# Patient Record
Sex: FEMALE | Race: WHITE | URBAN - METROPOLITAN AREA
[De-identification: names, ages, dates, MRNs, and addresses within clinical notes are randomized per-mention and may not be internally consistent; named-entity substitution may affect disease eponyms.]

---

## 2021-08-09 ENCOUNTER — OFFICE VISIT (OUTPATIENT)
Dept: URBAN - METROPOLITAN AREA CLINIC 64 | Facility: CLINIC | Age: 46
End: 2021-08-09
Payer: COMMERCIAL

## 2021-08-09 PROCEDURE — 92133 CPTRZD OPH DX IMG PST SGM ON: CPT | Performed by: OPTOMETRIST

## 2021-08-09 PROCEDURE — 99204 OFFICE O/P NEW MOD 45 MIN: CPT | Performed by: OPTOMETRIST

## 2021-08-09 ASSESSMENT — INTRAOCULAR PRESSURE
OS: 12
OD: 16

## 2021-08-09 NOTE — IMPRESSION/PLAN
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 Plan: Suspect IIHT. Recommend an MRI of the brain and orbits with and without contrast followed by neurology consult.   She will go the ER right now to initiate further eval.

## 2021-08-12 ENCOUNTER — OFFICE VISIT (OUTPATIENT)
Dept: URBAN - METROPOLITAN AREA CLINIC 64 | Facility: CLINIC | Age: 46
End: 2021-08-12
Payer: COMMERCIAL

## 2021-08-12 PROCEDURE — 99213 OFFICE O/P EST LOW 20 MIN: CPT | Performed by: OPTOMETRIST

## 2021-08-12 ASSESSMENT — INTRAOCULAR PRESSURE
OS: 16
OD: 19

## 2021-08-12 NOTE — IMPRESSION/PLAN
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 Plan: She was able to get an MRI and the results were negative. Suspect idiopathic intracranial hypertension. Recommend she get a PCP and coordinate a referral to neuro. Alternatively we can try and schedule a consult for her at [facility].   If unable to get into neuro within 1-2 weeks then recommend she see a retina specialist.

## 2021-08-13 ENCOUNTER — OFFICE VISIT (OUTPATIENT)
Dept: URBAN - METROPOLITAN AREA CLINIC 64 | Facility: CLINIC | Age: 46
End: 2021-08-13
Payer: COMMERCIAL

## 2021-08-13 DIAGNOSIS — H47.11 PAPILLEDEMA ASSOCIATED WITH INCREASED INTRACRANIAL PRESSURE: Primary | ICD-10-CM

## 2021-08-13 PROCEDURE — 99204 OFFICE O/P NEW MOD 45 MIN: CPT | Performed by: STUDENT IN AN ORGANIZED HEALTH CARE EDUCATION/TRAINING PROGRAM

## 2021-08-13 RX ORDER — DOXYCYCLINE HYCLATE 100 MG/1
100 MG TABLET, COATED ORAL
Qty: 20 | Refills: 0 | Status: INACTIVE
Start: 2021-08-13 | End: 2021-12-03

## 2021-08-13 NOTE — IMPRESSION/PLAN
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 Plan: Discussed. Recommend stopping melatonin. Rx doxycycline 100mg BID for 10 days, RTC 1 week for follow up. RNFL done today. Patient works in veterinary care. Pt had 2 donor ligaments in right knee in 6/2020 and 2/2021.

## 2021-08-19 ENCOUNTER — OFFICE VISIT (OUTPATIENT)
Dept: URBAN - METROPOLITAN AREA CLINIC 64 | Facility: CLINIC | Age: 46
End: 2021-08-19
Payer: COMMERCIAL

## 2021-08-19 PROCEDURE — 92083 EXTENDED VISUAL FIELD XM: CPT | Performed by: STUDENT IN AN ORGANIZED HEALTH CARE EDUCATION/TRAINING PROGRAM

## 2021-08-19 PROCEDURE — 92133 CPTRZD OPH DX IMG PST SGM ON: CPT | Performed by: STUDENT IN AN ORGANIZED HEALTH CARE EDUCATION/TRAINING PROGRAM

## 2021-08-19 PROCEDURE — 99212 OFFICE O/P EST SF 10 MIN: CPT | Performed by: STUDENT IN AN ORGANIZED HEALTH CARE EDUCATION/TRAINING PROGRAM

## 2021-08-19 ASSESSMENT — INTRAOCULAR PRESSURE
OD: 15
OS: 15

## 2021-08-19 NOTE — IMPRESSION/PLAN
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 Plan: Suspect idiopathic intracranial hypertension. Pt still experiencing headaches and vision changes. Pt is not diabetic. Unsure if BG was tested at recent ED visit. VF 24-2 performed today; inf temp scotoma and enlarged blind spot OD. Full color plates OU. MRI and labs done at ED 8/9, normal.
Refer back to ED for further testing to rule out papilledema and hydrocephalus; lumbar puncture, neuro consult, possible MRV (venography) of orbit. Note: Pt had 2 donor ligaments in right knee 6/2020 and 2/2021.

## 2021-09-03 ENCOUNTER — OFFICE VISIT (OUTPATIENT)
Dept: URBAN - METROPOLITAN AREA CLINIC 64 | Facility: CLINIC | Age: 46
End: 2021-09-03
Payer: COMMERCIAL

## 2021-09-03 PROCEDURE — 99212 OFFICE O/P EST SF 10 MIN: CPT | Performed by: STUDENT IN AN ORGANIZED HEALTH CARE EDUCATION/TRAINING PROGRAM

## 2021-09-03 NOTE — IMPRESSION/PLAN
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 Plan: Discussed diagnosis in detail with patient. Advised patient of condition. Continue to monitor. Advised pt to see Dr. Temi Sheehan if vision is horizontal images and to make an appt immediately. Advised pt on doing weight loss regimen as 10% weight loss helps. Continue Diamox RTC 3 months RFNL.

## 2021-12-03 ENCOUNTER — OFFICE VISIT (OUTPATIENT)
Dept: URBAN - METROPOLITAN AREA CLINIC 64 | Facility: CLINIC | Age: 46
End: 2021-12-03
Payer: COMMERCIAL

## 2021-12-03 PROCEDURE — 99213 OFFICE O/P EST LOW 20 MIN: CPT | Performed by: STUDENT IN AN ORGANIZED HEALTH CARE EDUCATION/TRAINING PROGRAM

## 2021-12-03 ASSESSMENT — INTRAOCULAR PRESSURE
OS: 10
OD: 14

## 2021-12-03 NOTE — IMPRESSION/PLAN
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 Plan: Workup negative to date, normal LP and MRI. Improved on Diamox, now on Topamax (no angle closure). Symptoms have completely resolved for the patient. Optic nerves appear less swollen today OD>OS. Vessels visible. Repeat RNFL OCT and follow up in 2-3 months with HVF 30-2 to see if blindspot OD has improved.

## 2022-02-22 ENCOUNTER — TESTING ONLY (OUTPATIENT)
Dept: URBAN - METROPOLITAN AREA CLINIC 64 | Facility: CLINIC | Age: 47
End: 2022-02-22
Payer: COMMERCIAL

## 2022-02-22 PROCEDURE — 99213 OFFICE O/P EST LOW 20 MIN: CPT | Performed by: STUDENT IN AN ORGANIZED HEALTH CARE EDUCATION/TRAINING PROGRAM

## 2022-02-22 PROCEDURE — 92133 CPTRZD OPH DX IMG PST SGM ON: CPT | Performed by: STUDENT IN AN ORGANIZED HEALTH CARE EDUCATION/TRAINING PROGRAM

## 2022-02-22 PROCEDURE — 92083 EXTENDED VISUAL FIELD XM: CPT | Performed by: STUDENT IN AN ORGANIZED HEALTH CARE EDUCATION/TRAINING PROGRAM

## 2022-02-22 ASSESSMENT — INTRAOCULAR PRESSURE
OS: 13
OD: 10

## 2022-02-22 NOTE — IMPRESSION/PLAN
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 Plan: Workup negative to date, normal LP and MRI. Resolved on Topomax
-Topamax (no angle closure). Optic nerves appear less swollen today OD>OS. Vessels visible. Repeat  HVF 30-2 in 6 months. Patient to call if anything changes.

## 2022-08-09 ENCOUNTER — OFFICE VISIT (OUTPATIENT)
Dept: URBAN - METROPOLITAN AREA CLINIC 64 | Facility: CLINIC | Age: 47
End: 2022-08-09
Payer: COMMERCIAL

## 2022-08-09 DIAGNOSIS — H47.11 PAPILLEDEMA ASSOCIATED WITH INCREASED INTRACRANIAL PRESSURE: Primary | ICD-10-CM

## 2022-08-09 PROCEDURE — 99213 OFFICE O/P EST LOW 20 MIN: CPT | Performed by: STUDENT IN AN ORGANIZED HEALTH CARE EDUCATION/TRAINING PROGRAM

## 2022-08-09 PROCEDURE — 92083 EXTENDED VISUAL FIELD XM: CPT | Performed by: STUDENT IN AN ORGANIZED HEALTH CARE EDUCATION/TRAINING PROGRAM

## 2022-08-09 PROCEDURE — 92133 CPTRZD OPH DX IMG PST SGM ON: CPT | Performed by: STUDENT IN AN ORGANIZED HEALTH CARE EDUCATION/TRAINING PROGRAM

## 2022-08-09 ASSESSMENT — INTRAOCULAR PRESSURE
OS: 14
OD: 12

## 2022-08-09 NOTE — IMPRESSION/PLAN
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 Plan: Workup negative to date, normal LP and MRI. Resolved on Topomax
-No defects or enlargemtn of the blind spot OU
-Follow up yearly with OCT RNFL and HVF 24-2